# Patient Record
Sex: FEMALE | Race: OTHER
[De-identification: names, ages, dates, MRNs, and addresses within clinical notes are randomized per-mention and may not be internally consistent; named-entity substitution may affect disease eponyms.]

---

## 2018-02-19 ENCOUNTER — HOSPITAL ENCOUNTER (OUTPATIENT)
Dept: HOSPITAL 11 - JP.SDS | Age: 38
Discharge: HOME | End: 2018-02-19
Attending: SURGERY
Payer: MEDICAID

## 2018-02-19 DIAGNOSIS — K31.6: Primary | ICD-10-CM

## 2018-02-19 DIAGNOSIS — Z98.84: ICD-10-CM

## 2018-02-19 PROCEDURE — 43239 EGD BIOPSY SINGLE/MULTIPLE: CPT

## 2018-02-19 PROCEDURE — 81025 URINE PREGNANCY TEST: CPT

## 2018-02-19 PROCEDURE — 87081 CULTURE SCREEN ONLY: CPT

## 2018-02-21 NOTE — OR
DATE OF PROCEDURE:  02/19/2018

 

PREOPERATIVE DIAGNOSIS:  Weight regain, status post gastric bypass.

 

POSTOPERATIVE DIAGNOSIS:  Weight regain, status post gastric bypass associated with

gastrogastric fistula.

 

OPERATIVE PROCEDURE:  Upper gastrointestinal endoscopy with biopsies of gastric pouch for

CLOtest.

 

ANESTHESIA:  IV sedation.

 

INDICATION FOR PROCEDURE:  The patient is status post Heather-en-Y gastric bypass with

previously good result.  She has had significant weight regain, and plan is to proceed with

an upper GI endoscopy with biopsies as indicated.  Potential risks including bleeding and

perforation were discussed, and the patient wishes to proceed.

 

DETAILS OF PROCEDURE:  The patient was taken to the operating room and placed in a left

lateral decubitus position.  IV sedation was administered, after which the upper GI

endoscope was passed orally through the length of the esophagus and into the gastric pouch.

The patient was noted to have a fistula between the gastric pouch and the adjacent stomach.

The scope could easily be passed through that area.  Visualized portion of the bypassed

stomach and duodenum were otherwise unremarkable.  At that point, scope was pulled back up

into the gastric pouch, and the gastrojejunostomy was otherwise noted to be intact.  The

pouch was relatively normal-sized, around 3 to 4 cm.  Biopsies were obtained from the

gastric pouch and sent for the CLOtest for H. pylori.  Minimal bleeding from the biopsy

sites was seen, and the procedure was then concluded.  The patient was taken to the recovery

room in satisfactory condition.

 

Plan will be to address the patient's insurance company regarding prior authorization for

revision of the gastric pouch component of the Heather-en-Y gastric bypass.

 

 

 

 

Kevin Montgomery MD

DD:  02/20/2018 19:47:42

DT:  02/21/2018 04:14:08

Job #:  [id number]

## 2018-03-26 ENCOUNTER — HOSPITAL ENCOUNTER (INPATIENT)
Dept: HOSPITAL 11 - JP.MS | Age: 38
LOS: 2 days | Discharge: HOME | DRG: 629 | End: 2018-03-28
Attending: SURGERY | Admitting: SURGERY
Payer: MEDICAID

## 2018-03-26 DIAGNOSIS — R63.5: ICD-10-CM

## 2018-03-26 DIAGNOSIS — Z98.0: ICD-10-CM

## 2018-03-26 DIAGNOSIS — K91.2: ICD-10-CM

## 2018-03-26 DIAGNOSIS — E53.8: ICD-10-CM

## 2018-03-26 DIAGNOSIS — K31.6: ICD-10-CM

## 2018-03-26 DIAGNOSIS — F32.9: ICD-10-CM

## 2018-03-26 DIAGNOSIS — K66.0: ICD-10-CM

## 2018-03-26 DIAGNOSIS — Z98.84: ICD-10-CM

## 2018-03-26 DIAGNOSIS — E53.9: ICD-10-CM

## 2018-03-26 DIAGNOSIS — F51.01: ICD-10-CM

## 2018-03-26 DIAGNOSIS — E66.01: Primary | ICD-10-CM

## 2018-03-26 DIAGNOSIS — Z87.891: ICD-10-CM

## 2018-03-26 DIAGNOSIS — D50.9: ICD-10-CM

## 2018-03-26 PROCEDURE — 0DBA4ZX EXCISION OF JEJUNUM, PERCUTANEOUS ENDOSCOPIC APPROACH, DIAGNOSTIC: ICD-10-PCS | Performed by: SURGERY

## 2018-03-26 PROCEDURE — 0DNU4ZZ RELEASE OMENTUM, PERCUTANEOUS ENDOSCOPIC APPROACH: ICD-10-PCS | Performed by: SURGERY

## 2018-03-26 PROCEDURE — C9113 INJ PANTOPRAZOLE SODIUM, VIA: HCPCS

## 2018-03-26 PROCEDURE — 0D1A4ZH BYPASS JEJUNUM TO CECUM, PERCUTANEOUS ENDOSCOPIC APPROACH: ICD-10-PCS | Performed by: SURGERY

## 2018-03-26 PROCEDURE — 3E0M45Z INTRODUCTION OF ADHESION BARRIER INTO PERITONEAL CAVITY, PERCUTANEOUS ENDOSCOPIC APPROACH: ICD-10-PCS | Performed by: SURGERY

## 2018-03-26 PROCEDURE — 3E0T3BZ INTRODUCTION OF ANESTHETIC AGENT INTO PERIPHERAL NERVES AND PLEXI, PERCUTANEOUS APPROACH: ICD-10-PCS | Performed by: SURGERY

## 2018-03-26 PROCEDURE — 0DNW4ZZ RELEASE PERITONEUM, PERCUTANEOUS ENDOSCOPIC APPROACH: ICD-10-PCS | Performed by: SURGERY

## 2018-03-26 PROCEDURE — 0DN84ZZ RELEASE SMALL INTESTINE, PERCUTANEOUS ENDOSCOPIC APPROACH: ICD-10-PCS | Performed by: SURGERY

## 2018-03-26 RX ADMIN — ACETAMINOPHEN SCH MG: 650 SOLUTION ORAL at 16:33

## 2018-03-26 RX ADMIN — ACETAMINOPHEN SCH MG: 650 SOLUTION ORAL at 21:18

## 2018-03-26 RX ADMIN — LIDOCAINE HYDROCHLORIDE ANHYDROUS AND DEXTROSE MONOHYDRATE SCH MLS/HR: .4; 5 INJECTION, SOLUTION INTRAVENOUS at 16:33

## 2018-03-26 RX ADMIN — SODIUM CHLORIDE SCH UNITS: 0.9 INJECTION, SOLUTION INTRAVENOUS at 18:12

## 2018-03-26 RX ADMIN — GABAPENTIN SCH MG: 250 SOLUTION ORAL at 21:00

## 2018-03-27 RX ADMIN — ACETAMINOPHEN SCH MG: 650 SOLUTION ORAL at 04:00

## 2018-03-27 RX ADMIN — ACETAMINOPHEN SCH MG: 650 SOLUTION ORAL at 15:39

## 2018-03-27 RX ADMIN — ACETAMINOPHEN SCH MG: 650 SOLUTION ORAL at 21:13

## 2018-03-27 RX ADMIN — ACETAMINOPHEN SCH MG: 650 SOLUTION ORAL at 11:45

## 2018-03-27 RX ADMIN — GABAPENTIN SCH MG: 250 SOLUTION ORAL at 14:09

## 2018-03-27 RX ADMIN — SODIUM CHLORIDE SCH UNITS: 0.9 INJECTION, SOLUTION INTRAVENOUS at 17:05

## 2018-03-27 RX ADMIN — GABAPENTIN SCH MG: 250 SOLUTION ORAL at 21:13

## 2018-03-27 RX ADMIN — Medication SCH: at 11:15

## 2018-03-27 RX ADMIN — SODIUM FERRIC GLUCONATE COMPLEX SCH MLS/HR: 12.5 INJECTION INTRAVENOUS at 11:25

## 2018-03-27 RX ADMIN — LIDOCAINE HYDROCHLORIDE ANHYDROUS AND DEXTROSE MONOHYDRATE SCH MLS/HR: .4; 5 INJECTION, SOLUTION INTRAVENOUS at 03:45

## 2018-03-27 RX ADMIN — SODIUM CHLORIDE SCH UNITS: 0.9 INJECTION, SOLUTION INTRAVENOUS at 06:00

## 2018-03-27 RX ADMIN — GABAPENTIN SCH MG: 250 SOLUTION ORAL at 08:50

## 2018-03-28 RX ADMIN — ACETAMINOPHEN SCH MG: 650 SOLUTION ORAL at 09:10

## 2018-03-28 RX ADMIN — ACETAMINOPHEN SCH MG: 650 SOLUTION ORAL at 04:15

## 2018-03-28 RX ADMIN — Medication SCH: at 09:23

## 2018-03-28 RX ADMIN — SODIUM FERRIC GLUCONATE COMPLEX SCH MLS/HR: 12.5 INJECTION INTRAVENOUS at 11:11

## 2018-03-28 RX ADMIN — GABAPENTIN SCH MG: 250 SOLUTION ORAL at 09:08

## 2018-03-28 RX ADMIN — SODIUM CHLORIDE SCH UNITS: 0.9 INJECTION, SOLUTION INTRAVENOUS at 05:19

## 2018-03-28 NOTE — DISCH
ADMISSION DIAGNOSES:  Morbid obesity, BMI 45.7; weight gain following gastric bypass

surgery; SP Heather-en-Y gastric bypass surgery, unspecified surgical malabsorption; B12

deficiency; iron-deficiency anemia; history of tobacco use, recently quit; depression;

primary insomnia.

 

DISCHARGE DIAGNOSES:  Diagnostic laparoscopy with lysis of adhesions with revision of Heather-

en-Y gastric bypass surgery with division of gastrogastric fistula, revision of the

jejunostomy with small bowel resection and placement of Interceed mesh for weight regain, SP

Heather-en-Y gastric bypass surgery associated with gastrogastric fistula, devascularized

segment of the small bowel secondary to takedown of adhesions adjacent to the Heather limb, and

extensive intraabdominal adhesions.  Date of surgery, 03/26/2018.  Surgeon, Kevin Montgomery MD.

 

HISTORY:  Tyson Dewitt is a 38-year-old female with significant weight regain after Heather-en-

Y gastric bypass surgery with increasing comorbidities.  After preoperative evaluation and

discussion of possible risks and possible complications, she wished to proceed with surgical

procedure.

 

HOSPITAL COURSE:  Tyson had her surgery on 03/26/2018.  She had no complications.  On postop

day #1, she was started on step-2 gastric bypass diet without cereal and tolerated that

well.  She had dietary instruction.  Her activity was good.  Pain was well managed and vital

signs stable.  She was able to be discharged to home on postop day #2.

 

PHYSICAL EXAMINATION:

GENERAL:  Tyson is a 38-year-old female.

VITAL SIGNS:  Height is 5 feet 7 inches.  Weight is 291, BMI 45.7.  TPR 97.2, 75, 18.  Blood

pressure 121/62.

HEENT:  Negative.

NECK:  Supple.

HEART:  Regular rate and rhythm.

LUNGS:  Clear.

ABDOMEN:  Incisions good.  Abdominal binder is on.

EXTREMITIES:  Without peripheral edema.

 

DISPOSITION:  Discharged to home.

 

CONDITION:  Stable and improving.

 

FOLLOWUP APPOINTMENT:  With Steph Moreno PA-C on 04/05/2018 at 10:00 a.m.

 

NEW PRESCRIPTIONS:

1. Tylenol 650 mg oral q.6 hours, chewable or liquid, scheduled for 2 weeks.

2. Celebrex 200 mg p.o. daily, #14.

 

HOME MEDICATIONS:  She is to resume her Chantix.  Discontinue taking vitamins and

supplements until first postop appointment.

 

DISCHARGE DIET:  Step 2 gastric bypass diet without cereal, drink 8 to 10 glasses of water a

day.

 

ACTIVITY:  Walk 6 times daily, distance and time as tolerated.

 

DRIVING AFTER DISCHARGE:  Do not drive for 1 week.

 

SHOWER/BATHING:  May shower.

 

DISCHARGE INSTRUCTIONS:  Notify provider if any fever, increased pain, nausea, or vomiting.

Keep site clean and dry.  Wear abdominal binder for 2 weeks and then as tolerated.

 

SPECIAL INSTRUCTION:  Use incentive spirometer 10 times every hour while awake for 1 week

and keep a journal of protein and liquid intake and bring to clinic appointments.

## 2018-03-29 NOTE — OR
DATE OF PROCEDURE:  03/26/2018

 

PREOPERATIVE DIAGNOSIS:  Weight regain status post Heather-en-Y gastric bypass associated with

gastrogastric fistula.

 

POSTOPERATIVE DIAGNOSES:

1. Weight regain status post Heather-en-Y gastric bypass associated with gastrogastric

    fistula.

2. Devascularized segment of small bowel secondary to takedown of adhesions adjacent to

    Heather limb.

3. Extensive intra-abdominal adhesions.

 

OPERATIVE PROCEDURES:  Diagnostic laparoscopy with lysis of adhesions with:

1. Revision of Heather-en-Y gastric bypass with:

    a.     Division of gastrogastric fistula.

    b.     Revision of jejunojejunostomy (23852).

2. Small bowel resection (74277).

3. Placement of Interceed mesh to displace pelvic and abdominal wall from underlying

    viscera to limit recurrent adhesion formation (50888).

 

ANESTHESIA:  General.

 

ASSISTANT:  Steph Moreno PA-C.

 

INDICATION FOR PROCEDURE:  This 38-year-old is status post Heather-en-Y gastric bypass.

Originally, she had a quite good result, but in the recent years, after pregnancy and more

recently after developing gastrogastric fistula, she has had significant weight regain.

Plan is to proceed with a revision of the Heather-en-Y gastric bypass by means of both division

of the gastric fistula and rerouting the Heather limb to increase the amount of malabsorption.

The potential risks of the procedure including bleeding, infection, leaks from the GI tract

closures, and possible bowel obstruction over time were reviewed.  Additionally, the patient

is aware that she will likely have some more frequent loose bowel movements and would have

to maintain a little bit more vigilance in terms of her nutritional status, and she wishes

to proceed.

 

DETAILS OF PROCEDURE:  The patient was taken to the operating room and placed in a supine

position.  After general endotracheal anesthesia was induced, she was converted to a

lithotomy position, and the abdomen was prepped and draped.

 

At 15 cm inferior and 5 cm left of xiphoid process, a transverse incision was made and the

peritoneal cavity entered under direct vision with an Optiview trocar and inflated to 15

mmHg pressure with CO2.  Laparoscope was then reinserted.  No underlying trocar insertion

site injuries were seen.  Following this, bilateral subcostal transversus abdominis plane

blocks were placed with direct visualization of the needle in the correct location and

standard injectate placed bilaterally.  Following this, eventually, 5 additional trocars

were placed across the upper and mid-abdomen.  Initially, the patient was noted to have

quite a bit in the way of adhesions, both between the small bowel and the pelvic and

abdominal wall, as well as some omental adhesions to those areas as well.  These were taken

down with a combination of blunt and sharp dissections.

 

At this point, attention was taken to the gastric portion of the gastric bypass.  As one

began to dissect along the blind end of the Heather limb, this brought up to the edge of the

stomach and allowed continued dissection for the plane between the gastric pouch and the

remainder of the stomach.  Once this was encircled, this was then divided with a combination

of purple and black loads.  The Valerie tube had been placed orally and placed across the

gastric pouch and from there into the main route of the Heather limb, so as to avoid

overtightening of the gastric pouch.  During the course of this dissection, the small bowel

involving the blind end of the Heather limb became devascularized, and this was therefore

resected as an additional procedure with RIANA perez load after division of remainder of its

mesentery.

 

At this point, attention was taken to the small bowel.  The patient was noted to have a Heather

limb at this point measuring 80 cm.  This was divided flush with the jejunojejunostomy.  At

that point, then the ileocecal valve was encountered and tracked back 220 cm, giving the

patient a total alimentary length of 300 cm.  At that point, the side-to-side

enteroenterostomy was accomplished between the end of the Heather limb and that point 220 cm

proximal to the ileocecal valve.  This was done with an internal firing of the RIANA tan load,

and the purple load was used to closed the common opening, angles anastomosed, and

mesenteric defect was then approximated with some 3-0 Vicryl stitch, along with fibrin

sealant.

 

At this point, to limit recurrent adhesion formation, Interceed mesh was placed across the

lower abdomen and pelvis, thus providing some separation between the underlying viscera,

particularly the most recent small bowel anastomosis.  At that point, the trocars were then

sequentially removed and the peritoneal cavity deflated.  Incisions were closed with some 4-

0 Vicryl skin stitch, and the fascia had been closed with #1 Vicryl stitch.  The patient was

taken to the recovery room in a satisfactory condition.  There were no evident

complications.

 

Physician assistant, Steph Moreno, played an essential role in assisting in this case,

helping to position the patient, retract structures as needed, as well as suturing and

cutting sutures when indicated.  Her presence improved patient safety and decreased the

operative time.

 

 

 

 

Kevin Montgomery MD

DD:  03/28/2018 08:00:42

DT:  03/28/2018 11:45:08

Job #:  [id number]

## 2023-04-13 NOTE — PN
DATE OF SERVICE:  03/27/2018

 

The patient is status post revision of Heather-en-Y gastric bypass yesterday and has done well

postoperatively.  Pain control appears to be adequate with the oral regimen, upper GI x-ray

looks good.  The plan will be to back down on the IV rate.  We will give her a step-3 diet

today.  She will be getting the first of 2 iron infusions today and will likely be ready for

discharge home after that second infusion is completed tomorrow.

 

 

 

 

Kevin Montgomery MD

DD:  03/27/2018 06:56:54

DT:  03/28/2018 09:51:56

Job #:  [id number] To get better and follow your care plan as instructed.